# Patient Record
Sex: FEMALE | Race: AMERICAN INDIAN OR ALASKA NATIVE
[De-identification: names, ages, dates, MRNs, and addresses within clinical notes are randomized per-mention and may not be internally consistent; named-entity substitution may affect disease eponyms.]

---

## 2019-05-30 ENCOUNTER — HOSPITAL ENCOUNTER (INPATIENT)
Dept: HOSPITAL 5 - ED | Age: 69
LOS: 3 days | Discharge: HOME | DRG: 312 | End: 2019-06-02
Attending: HOSPITALIST | Admitting: HOSPITALIST
Payer: MEDICARE

## 2019-05-30 DIAGNOSIS — Z90.49: ICD-10-CM

## 2019-05-30 DIAGNOSIS — R07.81: ICD-10-CM

## 2019-05-30 DIAGNOSIS — I34.1: ICD-10-CM
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
## 2019-05-30 DIAGNOSIS — E86.0: ICD-10-CM

## 2019-05-30 DIAGNOSIS — K21.9: ICD-10-CM

## 2019-05-30 DIAGNOSIS — G43.909: ICD-10-CM

## 2019-05-30 DIAGNOSIS — R55: Primary | ICD-10-CM
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
## 2019-05-30 DIAGNOSIS — G35: ICD-10-CM

## 2019-05-30 DIAGNOSIS — I10: ICD-10-CM

## 2019-05-30 DIAGNOSIS — S00.83XA: ICD-10-CM
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
## 2019-05-30 DIAGNOSIS — N39.0: ICD-10-CM

## 2019-05-30 DIAGNOSIS — F12.90: ICD-10-CM

## 2019-05-30 LAB
ALBUMIN SERPL-MCNC: 3.6 G/DL (ref 3.9–5)
ALT SERPL-CCNC: 13 UNITS/L (ref 7–56)
APTT BLD: 23.8 SEC. (ref 24.2–36.6)
BASOPHILS # (AUTO): 0.1 K/MM3 (ref 0–0.1)
BASOPHILS NFR BLD AUTO: 0.6 % (ref 0–1.8)
BUN SERPL-MCNC: 19 MG/DL (ref 7–17)
BUN/CREAT SERPL: 16 %
CALCIUM SERPL-MCNC: 9.4 MG/DL (ref 8.4–10.2)
EOSINOPHIL # BLD AUTO: 0.2 K/MM3 (ref 0–0.4)
EOSINOPHIL NFR BLD AUTO: 1.7 % (ref 0–4.3)
HCT VFR BLD CALC: 41 % (ref 30.3–42.9)
HEMOLYSIS INDEX: 41
HGB BLD-MCNC: 13.9 GM/DL (ref 10.1–14.3)
INR PPP: 0.89 (ref 0.87–1.13)
LYMPHOCYTES # BLD AUTO: 1.7 K/MM3 (ref 1.2–5.4)
LYMPHOCYTES NFR BLD AUTO: 12.3 % (ref 13.4–35)
MCHC RBC AUTO-ENTMCNC: 34 % (ref 30–34)
MCV RBC AUTO: 94 FL (ref 79–97)
MONOCYTES # (AUTO): 0.9 K/MM3 (ref 0–0.8)
MONOCYTES % (AUTO): 6.5 % (ref 0–7.3)
PLATELET # BLD: 189 K/MM3 (ref 140–440)
RBC # BLD AUTO: 4.36 M/MM3 (ref 3.65–5.03)

## 2019-05-30 PROCEDURE — 80048 BASIC METABOLIC PNL TOTAL CA: CPT

## 2019-05-30 PROCEDURE — 80053 COMPREHEN METABOLIC PANEL: CPT

## 2019-05-30 PROCEDURE — 84484 ASSAY OF TROPONIN QUANT: CPT

## 2019-05-30 PROCEDURE — 82550 ASSAY OF CK (CPK): CPT

## 2019-05-30 PROCEDURE — 72125 CT NECK SPINE W/O DYE: CPT

## 2019-05-30 PROCEDURE — 81001 URINALYSIS AUTO W/SCOPE: CPT

## 2019-05-30 PROCEDURE — 36415 COLL VENOUS BLD VENIPUNCTURE: CPT

## 2019-05-30 PROCEDURE — 70551 MRI BRAIN STEM W/O DYE: CPT

## 2019-05-30 PROCEDURE — 93010 ELECTROCARDIOGRAM REPORT: CPT

## 2019-05-30 PROCEDURE — 87086 URINE CULTURE/COLONY COUNT: CPT

## 2019-05-30 PROCEDURE — 93005 ELECTROCARDIOGRAM TRACING: CPT

## 2019-05-30 PROCEDURE — 83735 ASSAY OF MAGNESIUM: CPT

## 2019-05-30 PROCEDURE — 82553 CREATINE MB FRACTION: CPT

## 2019-05-30 PROCEDURE — 82962 GLUCOSE BLOOD TEST: CPT

## 2019-05-30 PROCEDURE — 85025 COMPLETE CBC W/AUTO DIFF WBC: CPT

## 2019-05-30 PROCEDURE — 70544 MR ANGIOGRAPHY HEAD W/O DYE: CPT

## 2019-05-30 PROCEDURE — 85730 THROMBOPLASTIN TIME PARTIAL: CPT

## 2019-05-30 PROCEDURE — 85610 PROTHROMBIN TIME: CPT

## 2019-05-30 PROCEDURE — 70450 CT HEAD/BRAIN W/O DYE: CPT

## 2019-05-30 PROCEDURE — 70547 MR ANGIOGRAPHY NECK W/O DYE: CPT

## 2019-05-30 PROCEDURE — 84100 ASSAY OF PHOSPHORUS: CPT

## 2019-05-30 PROCEDURE — 93306 TTE W/DOPPLER COMPLETE: CPT

## 2019-05-30 NOTE — EMERGENCY DEPARTMENT REPORT
Blank Doc





- Documentation


Documentation: 





This is a 68-year-old female that presents with syncope episode today.  





This initial assessment/diagnostic orders/clinical plan/treatment(s) is/are 

subject to change based on patient's health status, clinical progression and re-

assessment by fellow clinical providers in the ED.  Further treatment and workup

at subsequent clinical providers discretion.  Patient/guardians urged not to 

elope from the ED as their condition may be serious if not clinically assessed 

and managed.  Initial orders include:


1- Patient sent to MAIN ED for further evaluation and treatment


2- EKG


3- labs


3- CT head

## 2019-05-31 LAB
BACTERIA #/AREA URNS HPF: (no result) /HPF
BILIRUB UR QL STRIP: (no result)
BLOOD UR QL VISUAL: (no result)
HYALINE CASTS #/AREA URNS LPF: 5 /LPF
MUCOUS THREADS #/AREA URNS HPF: (no result) /HPF
PH UR STRIP: 6 [PH] (ref 5–7)
PROT UR STRIP-MCNC: (no result) MG/DL
RBC #/AREA URNS HPF: 2 /HPF (ref 0–6)
UROBILINOGEN UR-MCNC: < 2 MG/DL (ref ?–2)
WBC #/AREA URNS HPF: 11 /HPF (ref 0–6)

## 2019-05-31 RX ADMIN — METOPROLOL TARTRATE SCH MG: 50 TABLET, FILM COATED ORAL at 18:02

## 2019-05-31 RX ADMIN — OXYCODONE AND ACETAMINOPHEN PRN TAB: 5; 325 TABLET ORAL at 09:29

## 2019-05-31 RX ADMIN — MONTELUKAST SCH MG: 10 TABLET, FILM COATED ORAL at 18:02

## 2019-05-31 RX ADMIN — HYDRALAZINE HYDROCHLORIDE SCH: 25 TABLET, FILM COATED ORAL at 22:00

## 2019-05-31 RX ADMIN — Medication SCH ML: at 09:29

## 2019-05-31 RX ADMIN — VERAPAMIL HYDROCHLORIDE SCH MG: 240 TABLET, FILM COATED, EXTENDED RELEASE ORAL at 18:02

## 2019-05-31 RX ADMIN — OXYCODONE AND ACETAMINOPHEN PRN TAB: 5; 325 TABLET ORAL at 23:20

## 2019-05-31 RX ADMIN — Medication SCH ML: at 22:00

## 2019-05-31 NOTE — MAGNETIC RESONANCE REPORT
MRA NECK WITHOUT CONTRAST



HISTORY: Syncope.



TECHNIQUE: Time-of-flight imaging with rotational MIP reformations of 

the neck is submitted.  



FINDINGS:

The bilateral carotid and vertebral systems appear widely

patent and free of hemodynamically significant stenosis, aneurysm, or 

dissection.  



IMPRESSION:

Unremarkable MR angiography of the neck.

## 2019-05-31 NOTE — EVENT NOTE
Date: 05/31/19





Patient seen and examined


Patient admitted with a complaint of syncope


Continue current management and plan as dictated H&P


Wait for medical records from Martín Chauhan, cardiology following.

## 2019-05-31 NOTE — MAGNETIC RESONANCE REPORT
MRA HEAD WITHOUT CONTRAST



HISTORY: Syncope.



Time-of-flight imaging with MIP reformations of the [place]

[place] is submitted.



The arteries appear widely patent and free of hemodynamically

significant stenosis, aneurysm or dissection.  



IMPRESSION:

Unremarkable MRA head.

## 2019-05-31 NOTE — CAT SCAN REPORT
PROCEDURE: CT CERVICAL SPINE WO CON 

 

TECHNIQUE:  Computerized tomography of the cervical spine was performed from the skull base to T1 wit
hout contrast material.   

 

 

HISTORY: Syncope 

 

COMPARISONS:  None . 

 

FINDINGS: 

 

No fracture or subluxation is seen. Prevertebral soft tissues appear normal. Posterior elements are i
ntact. Moderate facet arthritis visualized bilaterally at C2-3, C3-4, milder changes seen in the miguel angel
gutierrez of the cervical spine.. Diffuse disc space narrowing with anterior and posterior osteophyte for
mation visualized from the C3-4 through the C7-T1 level. The posterior osteophytic spurs are largest 
at C6-C7. No focal disc herniation is seen. The posterior osteophytic spurs do obscure portions of th
e anterior epidural space without definite cord compression or spinal stenosis. 

 

 

IMPRESSION:  No fracture or subluxation is seen. Moderate degenerative disc disease 7) is present as 
described. No focal disc herniation or spinal stenosis is seen. . 

 

This document is electronically signed by Veto Bentley MD., May 31 2019 12:41:43 AM ET

## 2019-05-31 NOTE — HISTORY AND PHYSICAL REPORT
<SAINT-JORGE,HURLINE - Last Filed: 05/31/19 05:22>





History of Present Illness


Date of examination: 05/31/19


Date of admission: 


05/31/19 03:21





Chief complaint: 





Syncope


History of present illness: 





Pt is a 68-year-old female with PMHx of GERD, hypertension, multiple sclerosis, 

and mitral valve prolapse (MVP) who was brought to the ER for complaints of 

syncopal episode.  Patient states she felt lightheaded and weak, lost 

consciousness and collapsed to the ground.  Pt's daughter who witness the 

incident states that she passed out  for a short period, landed on the ground 

and strike the left side of her head.  She did not report any seizure activity, 

bladder or bowel incontinence, postical confusion or disorientation.  Patient 

complains of headache, pain on the left rib cage, that is worse with movement, 

manipulation and with deep breathing.  Pt denies chest pain, denies 

palpitations, denies abdominal pain, denies dizziness, denies nausea, denies 

vomiting, denies SOB,  denies diaphoresis.  On admission, pt was alert and 

orientated x 4, pleasant, able to provide medical history with excellent memory.

A CT scan of the head was negative except for mild decrease periventricular 

white matter, CT spine negative fracture except for degenerative joint disease, 

reviewed extremity was negative for fracture.  Patient is admitted for further 

evaluation of her syncopal episode.





Past History


Past Medical History: CAD (mitral valve prolapse), GERD, hypertension, 

migraines, other (multiple sclerosis)


Past Surgical History: No surgical history


Social history: no significant social history


Family history: no significant family history





Medications and Allergies


                                    Allergies











Allergy/AdvReac Type Severity Reaction Status Date / Time


 


No Known Allergies Allergy   Unverified 05/30/19 21:21











Active Meds: 


Active Medications





Acetaminophen (Tylenol)  650 mg PO Q4H PRN


   PRN Reason: Pain MILD(1-3)/Fever >100.5/HA


Ondansetron HCl (Zofran)  4 mg IV Q8H PRN


   PRN Reason: Nausea And Vomiting


Sodium Chloride (Sodium Chloride Flush Syringe 10 Ml)  10 ml IV BID LV


Sodium Chloride (Sodium Chloride Flush Syringe 10 Ml)  10 ml IV PRN PRN


   PRN Reason: LINE FLUSH











Review of Systems


Musculoskeletal: other (left rib cage pain)





Exam





- Constitutional


Vitals: 


                                        











Temp Pulse Resp BP Pulse Ox


 


 98.0 F   75   16   165/74   99 


 


 05/30/19 22:01  05/30/19 23:31  05/30/19 23:33  05/31/19 04:05  05/31/19 04:05











General appearance: Present: no acute distress





- EENT


Eyes: Present: PERRL, EOM intact


ENT: hearing intact





- Neck


Neck: Present: supple





- Respiratory


Respiratory: bilateral: CTA





- Cardiovascular


Heart Sounds: Present: S1 & S2





- Extremities


Extremities: no ischemia


Peripheral Pulses: within normal limits





- Abdominal


General gastrointestinal: Present: soft, non-tender, distended





- Rectal


Rectal Exam: deferred





- Integumentary


Integumentary: Present: clear, warm, dry





- Musculoskeletal


Musculoskeletal: strength equal bilaterally





- Psychiatric


Psychiatric: appropriate mood/affect, cooperative





- Neurologic


Neurologic: moves all extremities





Results





- Labs


CBC & Chem 7: 


                                 05/30/19 22:50





                                 05/30/19 22:50


Labs: 


                             Laboratory Last Values











WBC  13.6 K/mm3 (4.5-11.0)  H  05/30/19  22:50    


 


RBC  4.36 M/mm3 (3.65-5.03)   05/30/19  22:50    


 


Hgb  13.9 gm/dl (10.1-14.3)   05/30/19  22:50    


 


Hct  41.0 % (30.3-42.9)   05/30/19  22:50    


 


MCV  94 fl (79-97)   05/30/19  22:50    


 


MCH  32 pg (28-32)   05/30/19  22:50    


 


MCHC  34 % (30-34)   05/30/19  22:50    


 


RDW  14.7 % (13.2-15.2)   05/30/19  22:50    


 


Plt Count  189 K/mm3 (140-440)   05/30/19  22:50    


 


Lymph % (Auto)  12.3 % (13.4-35.0)  L  05/30/19  22:50    


 


Mono % (Auto)  6.5 % (0.0-7.3)   05/30/19  22:50    


 


Eos % (Auto)  1.7 % (0.0-4.3)   05/30/19  22:50    


 


Baso % (Auto)  0.6 % (0.0-1.8)   05/30/19  22:50    


 


Lymph #  1.7 K/mm3 (1.2-5.4)   05/30/19  22:50    


 


Mono #  0.9 K/mm3 (0.0-0.8)  H  05/30/19  22:50    


 


Eos #  0.2 K/mm3 (0.0-0.4)   05/30/19  22:50    


 


Baso #  0.1 K/mm3 (0.0-0.1)   05/30/19  22:50    


 


Seg Neutrophils %  78.9 % (40.0-70.0)  H  05/30/19  22:50    


 


Seg Neutrophils #  10.7 K/mm3 (1.8-7.7)  H  05/30/19  22:50    


 


PT  12.6 Sec. (12.2-14.9)   05/30/19  22:50    


 


INR  0.89  (0.87-1.13)   05/30/19  22:50    


 


APTT  23.8 Sec. (24.2-36.6)  L  05/30/19  22:50    


 


Sodium  141 mmol/L (137-145)   05/30/19  22:50    


 


Potassium  3.9 mmol/L (3.6-5.0)   05/30/19  22:50    


 


Chloride  108.5 mmol/L ()  H  05/30/19  22:50    


 


Carbon Dioxide  20 mmol/L (22-30)  L  05/30/19  22:50    


 


  16 mmol/L  05/30/19  22:50    


 


BUN  19 mg/dL (7-17)  H  05/30/19  22:50    


 


  1.2 mg/dL (0.7-1.2)   05/30/19  22:50    


 


Estimated GFR  54 ml/min  05/30/19  22:50    


 


  16 %  05/30/19  22:50    


 


Glucose  110 mg/dL ()  H  05/30/19  22:50    


 


Calcium  9.4 mg/dL (8.4-10.2)   05/30/19  22:50    


 


  < 0.20 mg/dL (0.1-1.2)   05/30/19  22:50    


 


AST  16 units/L (5-40)   05/30/19  22:50    


 


ALT  13 units/L (7-56)   05/30/19  22:50    


 


  49 units/L ()   05/30/19  22:50    


 


  125 units/L ()   05/30/19  22:50    


 


CK-MB (CK-2)  2.8 ng/mL (0.0-4.0)   05/30/19  22:50    


 


CK-MB (CK-2) Rel Index  2.2  (0-4)   05/30/19  22:50    


 


  < 0.010 ng/mL (0.00-0.029)   05/30/19  22:50    


 


  6.8 g/dL (6.3-8.2)   05/30/19  22:50    


 


  3.6 g/dL (3.9-5)  L  05/30/19  22:50    


 


  1.1 %  05/30/19  22:50    


 


  Yellow  (Yellow)   05/30/19  04:00    


 


  Slightly-cloudy  (Clear)   05/30/19  04:00    


 


  6.0  (5.0-7.0)   05/30/19  04:00    


 


Ur Specific Gravity  1.023  (1.003-1.030)   05/30/19  04:00    


 


  <15 mg/dl mg/dL (Negative)   05/30/19  04:00    


 


  Neg mg/dL (Negative)   05/30/19  04:00    


 


  Neg mg/dL (Negative)   05/30/19  04:00    


 


  Neg  (Negative)   05/30/19  04:00    


 


  Neg  (Negative)   05/30/19  04:00    


 


  Neg  (Negative)   05/30/19  04:00    


 


  < 2.0 mg/dL (<2.0)   05/30/19  04:00    


 


Ur Leukocyte Esterase  Neg  (Negative)   05/30/19  04:00    


 


  11.0 /HPF (0.0-6.0)  H  05/30/19  04:00    


 


  2.0 /HPF (0.0-6.0)   05/30/19  04:00    


 


U Epithel Cells (Auto)  7.0 /HPF (0-13.0)   05/30/19  04:00    


 


  1+ /HPF (Negative)   05/30/19  04:00    


 


Hyaline Casts  5 /LPF  05/30/19  04:00    


 


  2+ /HPF  05/30/19  04:00    














Assessment and Plan


Assessment and plan: 





1.  Acute syncopal episode (likely due to valvular disease)


2.  Mitral valve prolapse


3.  Mild leukocytosis (likely due to the margination)


4.  Multiple sclerosis (on remission)


5. H/o migraine headache


6.  Hypertension


7.  GERD





Plan.  Patient is admitted for acute syncope


Neuro check every 4 hours


Orthostatic vital signs


MRI of the brain, MRA of the head and neck


Echocardiogram to evaluate for valvular disease


Review home meds when available


For percussion


We'll consult cardiology or Neurology test results available


Plan of care discussed with patient voiced understanding


Advance Directives: Yes


VTE prophylaxis?: Mechanical


Plan of care discussed with patient/family: Yes





<NAVIN UGAMAN - Last Filed: 06/01/19 03:15>





History of Present Illness


Date of admission: 


05/31/19 03:21








Medications and Allergies


Active Meds: 


Active Medications





Acetaminophen (Tylenol)  650 mg PO Q4H PRN


   PRN Reason: Pain MILD(1-3)/Fever >100.5/HA


Hydralazine HCl (Apresoline)  25 mg PO Q8HR [facility]


Lorazepam (Ativan)  2 mg IV Q4H PRN


   PRN Reason: Agitation


   Last Admin: 05/31/19 09:30 Dose:  2 mg


   Documented by: 


Metoprolol Tartrate (Lopressor)  50 mg PO DAILY [facility]


   Last Admin: 05/31/19 18:02 Dose:  50 mg


   Documented by: 


Miscellaneous Medication (Avonex (Nf))  30 mg SUB-Q QWEEK [facility]


Montelukast Sodium (Singulair)  10 mg PO QPM [facility]


   Last Admin: 05/31/19 18:02 Dose:  10 mg


   Documented by: 


Ondansetron HCl (Zofran)  4 mg IV Q8H PRN


   PRN Reason: Nausea And Vomiting


Oxycodone/Acetaminophen (Percocet 5/325)  1 tab PO Q6H PRN


   PRN Reason: Pain, Moderate (4-6)


   Last Admin: 05/31/19 23:20 Dose:  1 tab


   Documented by: 


Pantoprazole Sodium (Protonix)  40 mg PO DAILY [facility]


Sodium Chloride (Sodium Chloride Flush Syringe 10 Ml)  10 ml IV BID [facility]


   Last Admin: 05/31/19 09:29 Dose:  10 ml


   Documented by: 


Sodium Chloride (Sodium Chloride Flush Syringe 10 Ml)  10 ml IV PRN PRN


   PRN Reason: LINE FLUSH


Verapamil HCl (Calan Sr)  240 mg PO Q12HR [facility]


   Last Admin: 05/31/19 18:02 Dose:  240 mg


   Documented by: 











Exam





- Constitutional


Vitals: 


                                        











Temp Pulse Resp BP Pulse Ox


 


 98.3 F   71   20   132/77   94 


 


 06/01/19 00:08  06/01/19 00:08  06/01/19 00:08  06/01/19 00:08  06/01/19 00:08














Results





- Labs


CBC & Chem 7: 


                                 05/30/19 22:50





                                 05/30/19 22:50


Labs: 


                             Laboratory Last Values











WBC  13.6 K/mm3 (4.5-11.0)  H  05/30/19  22:50    


 


RBC  4.36 M/mm3 (3.65-5.03)   05/30/19  22:50    


 


Hgb  13.9 gm/dl (10.1-14.3)   05/30/19  22:50    


 


Hct  41.0 % (30.3-42.9)   05/30/19  22:50    


 


MCV  94 fl (79-97)   05/30/19  22:50    


 


MCH  32 pg (28-32)   05/30/19  22:50    


 


MCHC  34 % (30-34)   05/30/19  22:50    


 


RDW  14.7 % (13.2-15.2)   05/30/19  22:50    


 


Plt Count  189 K/mm3 (140-440)   05/30/19  22:50    


 


Lymph % (Auto)  12.3 % (13.4-35.0)  L  05/30/19  22:50    


 


Mono % (Auto)  6.5 % (0.0-7.3)   05/30/19  22:50    


 


Eos % (Auto)  1.7 % (0.0-4.3)   05/30/19  22:50    


 


Baso % (Auto)  0.6 % (0.0-1.8)   05/30/19  22:50    


 


Lymph #  1.7 K/mm3 (1.2-5.4)   05/30/19  22:50    


 


Mono #  0.9 K/mm3 (0.0-0.8)  H  05/30/19  22:50    


 


Eos #  0.2 K/mm3 (0.0-0.4)   05/30/19  22:50    


 


Baso #  0.1 K/mm3 (0.0-0.1)   05/30/19  22:50    


 


Seg Neutrophils %  78.9 % (40.0-70.0)  H  05/30/19  22:50    


 


Seg Neutrophils #  10.7 K/mm3 (1.8-7.7)  H  05/30/19  22:50    


 


PT  12.6 Sec. (12.2-14.9)   05/30/19  22:50    


 


INR  0.89  (0.87-1.13)   05/30/19  22:50    


 


APTT  23.8 Sec. (24.2-36.6)  L  05/30/19  22:50    


 


Sodium  141 mmol/L (137-145)   05/30/19  22:50    


 


Potassium  3.9 mmol/L (3.6-5.0)   05/30/19  22:50    


 


Chloride  108.5 mmol/L ()  H  05/30/19  22:50    


 


Carbon Dioxide  20 mmol/L (22-30)  L  05/30/19  22:50    


 


  16 mmol/L  05/30/19  22:50    


 


BUN  19 mg/dL (7-17)  H  05/30/19  22:50    


 


  1.2 mg/dL (0.7-1.2)   05/30/19  22:50    


 


Estimated GFR  54 ml/min  05/30/19  22:50    


 


  16 %  05/30/19  22:50    


 


Glucose  110 mg/dL ()  H  05/30/19  22:50    


 


POC Glucose  112  ()  H  05/31/19  17:45    


 


Calcium  9.4 mg/dL (8.4-10.2)   05/30/19  22:50    


 


  < 0.20 mg/dL (0.1-1.2)   05/30/19  22:50    


 


AST  16 units/L (5-40)   05/30/19  22:50    


 


ALT  13 units/L (7-56)   05/30/19  22:50    


 


  49 units/L ()   05/30/19  22:50    


 


  125 units/L ()   05/30/19  22:50    


 


CK-MB (CK-2)  2.8 ng/mL (0.0-4.0)   05/30/19  22:50    


 


CK-MB (CK-2) Rel Index  2.2  (0-4)   05/30/19  22:50    


 


  < 0.010 ng/mL (0.00-0.029)   05/30/19  22:50    


 


  6.8 g/dL (6.3-8.2)   05/30/19  22:50    


 


  3.6 g/dL (3.9-5)  L  05/30/19  22:50    


 


  1.1 %  05/30/19  22:50    


 


  Yellow  (Yellow)   05/30/19  04:00    


 


  Slightly-cloudy  (Clear)   05/30/19  04:00    


 


  6.0  (5.0-7.0)   05/30/19  04:00    


 


Ur Specific Gravity  1.023  (1.003-1.030)   05/30/19  04:00    


 


  <15 mg/dl mg/dL (Negative)   05/30/19  04:00    


 


  Neg mg/dL (Negative)   05/30/19  04:00    


 


  Neg mg/dL (Negative)   05/30/19  04:00    


 


  Neg  (Negative)   05/30/19  04:00    


 


  Neg  (Negative)   05/30/19  04:00    


 


  Neg  (Negative)   05/30/19  04:00    


 


  < 2.0 mg/dL (<2.0)   05/30/19  04:00    


 


Ur Leukocyte Esterase  Neg  (Negative)   05/30/19  04:00    


 


  11.0 /HPF (0.0-6.0)  H  05/30/19  04:00    


 


  2.0 /HPF (0.0-6.0)   05/30/19  04:00    


 


U Epithel Cells (Auto)  7.0 /HPF (0-13.0)   05/30/19  04:00    


 


  1+ /HPF (Negative)   05/30/19  04:00    


 


Hyaline Casts  5 /LPF  05/30/19  04:00    


 


  2+ /HPF  05/30/19  04:00    














Assessment and Plan


Assessment and plan: 





I personally discussed the patient with the NP-C. I agree with the above 

assessment and plan

## 2019-05-31 NOTE — EMERGENCY DEPARTMENT REPORT
ED Syncope HPI





- General


Chief Complaint: Syncope


Stated Complaint: FELL WITH HEAD INJURY


Time Seen by Provider: 05/30/19 22:03


Source: patient, family


Exam Limitations: no limitations





- History of Present Illness


Initial Comments: 





68-year-old female with past medical history GERD, hypertension, multiple 

sclerosis, and mitral valve prolapse as a possible complaints of syncopal 

episode.  Patient states she felt lightheaded and weak all over.  Her daughter 

showed me a video of the patient.  She was exiting the home when episode 

happened.  At the doorway she became weak and wasn't responding to conversation.

 She then fell forward as her purse caught in the door handle, landing on the 

ground and striking the left side of her head.  No seizure activity noted.  

Unconscious episode was very brief.  Patient has been icing her left forehead 

hematoma.  Complains of 7/10 headache.  She denies any shortness of breath, 

chest pain, palpitations, abdominal pain, nausea, vomiting, diaphoresis, melena,

hematochezia, diarrhea, hematemesis, or decreased by mouth intake.  Since the 

fall she has had a left sided rib pain that is worse with movement, palpation, 

and deep inspiration.  Patient reports he had a stress test several weeks ago 

performed by her PMD.  PMD is affiliated with [facility].   She also sees a 

neurologist for her multiple sclerosis.





- Related Data


Allergies/Adverse Reactions: 


Allergies





No Known Allergies Allergy (Unverified 05/30/19 21:21)


   











ED Review of Systems


ROS: 


Stated complaint: FELL WITH HEAD INJURY


Other details as noted in HPI





Comment: All other systems reviewed and negative





ED Past Medical Hx





- Past Medical History


Hx Hypertension: Yes


Hx CVA: No


Hx Heart Attack/AMI: No


Hx Congestive Heart Failure: No


Hx Diabetes: No


Hx Deep Vein Thrombosis: No


Hx Pulmonary Embolism: No


Hx GERD: Yes


Hx Liver Disease: No


Hx Renal Disease: No


Hx of Cancer: No


Hx Sickle Cell Disease: No


Hx Arthritis: Yes


Hx Headaches / Migraines: Yes


Hx Seizures: No


Hx Kidney Stones: No


Hx Psychiatric Treatment: No


Hx Asthma:  (Bronchitis)


Hx COPD: No


Hx Tuberculosis: No


Hx Dementia: No


Hx HIV: No


Additional medical history: MS, MVP





- Surgical History


Hx Open Heart Surgery: No


Hx Pacemaker: No


Hx Internal Defibrillator: No


Hx Cholecystectomy: Yes


Hx Appendectomy: No


Hx Breast Surgery: No


Additional Surgical History: Hernia Repair, ACL Repair





- Social History


Smoking Status: Never Smoker


Substance Use Type: Marijuana





ED Physical Exam





- General


Limitations: No Limitations





- Other


Other exam information: 





General: No limitations, patient is alert in no acute distress


Head exam: Atraumatic, normocephalic


Eyes exam: Normal appearance


ENT: Moist mucous membrane, normal oropharynx


Neck exam: Normal inspection, full range of motion, no meningismus nontender


Respiratory exam: Clear to auscultation bilateral, no wheezes, rales, crackles


Cardiovascular: Normal rate and rhythm, lower rib tenderness underneath the 

breast.  Without crepitus, ecchymosis, or deformity.  L


Abdomen: Soft, nondistended, and  nontender, with normal bowel sounds, no 

rebound, or guarding


Extremity: Full range of motion normal inspection no deformity


Back: Normal Inspection, full range of motion, no tenderness


Neurologic: Alert, oriented x3, cranial nerves intact, no motor or sensory 

deficit


Psychiatric: normal affect, normal mood


Skin: Warm, dry, intact





ED Course


                                   Vital Signs











  05/30/19 05/30/19 05/30/19





  21:17 22:01 23:26


 


Temperature 98.0 F 98.0 F 


 


Pulse Rate 87 90 77


 


Respiratory 18 18 11 L





Rate   


 


Blood Pressure 105/56 105/56 


 


O2 Sat by Pulse 98 98 





Oximetry   














  05/30/19 05/30/19 05/30/19





  23:27 23:28 23:29


 


Temperature   


 


Pulse Rate 79 78 81


 


Respiratory 13 16 12





Rate   


 


Blood Pressure  165/84 165/84


 


O2 Sat by Pulse 99 99 99





Oximetry   














  05/30/19 05/30/19





  23:31 23:33


 


Temperature  


 


Pulse Rate 75 


 


Respiratory 11 L 16





Rate  


 


Blood Pressure 165/84 


 


O2 Sat by Pulse 99 99





Oximetry  














ED Medical Decision Making





- Lab Data


Result diagrams: 


                                 05/30/19 22:50





                                 05/30/19 22:50








                                   Lab Results











  05/30/19 05/30/19 05/30/19 Range/Units





  22:50 22:50 22:50 


 


WBC  13.6 H    (4.5-11.0)  K/mm3


 


RBC  4.36    (3.65-5.03)  M/mm3


 


Hgb  13.9    (10.1-14.3)  gm/dl


 


Hct  41.0    (30.3-42.9)  %


 


MCV  94    (79-97)  fl


 


MCH  32    (28-32)  pg


 


MCHC  34    (30-34)  %


 


RDW  14.7    (13.2-15.2)  %


 


Plt Count  189    (140-440)  K/mm3


 


Lymph % (Auto)  12.3 L    (13.4-35.0)  %


 


Mono % (Auto)  6.5    (0.0-7.3)  %


 


Eos % (Auto)  1.7    (0.0-4.3)  %


 


Baso % (Auto)  0.6    (0.0-1.8)  %


 


Lymph #  1.7    (1.2-5.4)  K/mm3


 


Mono #  0.9 H    (0.0-0.8)  K/mm3


 


Eos #  0.2    (0.0-0.4)  K/mm3


 


Baso #  0.1    (0.0-0.1)  K/mm3


 


Seg Neutrophils %  78.9 H    (40.0-70.0)  %


 


Seg Neutrophils #  10.7 H    (1.8-7.7)  K/mm3


 


PT    12.6  (12.2-14.9)  Sec.


 


INR    0.89  (0.87-1.13)  


 


APTT    23.8 L  (24.2-36.6)  Sec.


 


Sodium   141   (137-145)  mmol/L


 


Potassium   3.9   (3.6-5.0)  mmol/L


 


Chloride   108.5 H   ()  mmol/L


 


Carbon Dioxide   20 L   (22-30)  mmol/L


 


Anion Gap   16   mmol/L


 


BUN   19 H   (7-17)  mg/dL


 


Creatinine   1.2   (0.7-1.2)  mg/dL


 


Estimated GFR   54   ml/min


 


BUN/Creatinine Ratio   16   %


 


Glucose   110 H   ()  mg/dL


 


Calcium   9.4   (8.4-10.2)  mg/dL


 


Total Bilirubin   < 0.20   (0.1-1.2)  mg/dL


 


AST   16   (5-40)  units/L


 


ALT   13   (7-56)  units/L


 


Alkaline Phosphatase   49   ()  units/L


 


Total Creatine Kinase   125   ()  units/L


 


CK-MB (CK-2)   2.8   (0.0-4.0)  ng/mL


 


CK-MB (CK-2) Rel Index   2.2   (0-4)  


 


Troponin T   < 0.010   (0.00-0.029)  ng/mL


 


Total Protein   6.8   (6.3-8.2)  g/dL


 


Albumin   3.6 L   (3.9-5)  g/dL


 


Albumin/Globulin Ratio   1.1   %














- EKG Data


-: EKG Interpreted by Me


EKG shows normal: sinus rhythm (69), axis (qrs -52), QRS complexes (qrsd 76), 

ST-T waves (lat t wave )


Rate: normal





- EKG Data


When compared to previous EKG there are: previous EKG unavailable





- Radiology Data


Radiology results: report reviewed


CT head non contrast





HISTORY: Syncope 





COMPARISONS: None . 





FINDINGS: 





Brain: There is no evidence of intracranial hemorrhage. No parenchymal 

hemorrhage is seen. No 


mass lesions or mass effect is identified. No abnormal extra-axial fluid 

collections or masses are 


seen. There is nonspecific mineralization of the right basal ganglia. 





There is some decreased density seen in the periventricular white matter without

mass effect. This


is fairly symmetric and does not exhibit any mass effect consistent with gliosis

probably on the 


basis of microvascular disease or white matter changes of aging. 





Ventricles: The ventricles are normal size and are midline.. 





Bone Windows: No evidence of fracture. 





Paranasal sinuses: Visualized portions appear clear.. 





Mastoid air cells: Clear. 





IMPRESSION: 





Mild decreased density periventricular white matter without mass effect 

suggesting gliosis related 


to microvascular disease or white matter changes of aging. 





No other abnormalities are identified. 





PROCEDURE: CT CERVICAL SPINE WO CON 





TECHNIQUE: Computerized tomography of the cervical spine was performed from the 

skull base to T1 


without contrast material. 








HISTORY: Syncope 





COMPARISONS: None . 





FINDINGS: 





No fracture or subluxation is seen. Prevertebral soft tissues appear normal. 

Posterior elements are


intact. Moderate facet arthritis visualized bilaterally at C2-3, C3-4, milder 

changes seen in the 


remainder of the cervical spine.. Diffuse disc space narrowing with anterior and

posterior 


osteophyte formation visualized from the C3-4 through the C7-T1 level. The 

posterior osteophytic 


spurs are largest at C6-C7. No focal disc herniation is seen. The posterior 

osteophytic spurs do 


obscure portions of the anterior epidural space without definite cord 

compression or spinal 


stenosis. 








IMPRESSION: No fracture or subluxation is seen. Moderate degenerative disc 

disease 7) is present 


as described. No focal disc herniation or spinal stenosis is seen. . 








PROCEDURE: XR RIBS UNI W PA CHEST 3+V LT 





TECHNIQUE: Unilateral rib radiographs, 2 views of the left ribs. 





HISTORY: fall, left rib pain 





COMPARISONS: None . 





FINDINGS: 





Lung: Normal . 


Pleural space: Normal . 


Pneumothorax: None . 


Bony thorax/ribs: No acute or displaced rib fractures. . 





IMPRESSION: No acute or displaced rib fracture





- Medical Decision Making





pt s/p syncope episode with forehead and rib injury


ct images without acute findings


ekg with flip t waves lat, no previous for comparison, trp neg. cp reproducible 

and occured after fall on left side


rib series results pending at dispo


pt tx with asa and tramadol


hospitalist informed for admission





- Differential Diagnosis


fracture, contusion, sprain


Critical Care Time: No


Critical care attestation.: 


If time is entered above; I have spent that time in minutes in the direct care 

of this critically ill patient, excluding procedure time.








ED Disposition


Clinical Impression: 


 Syncope, Rib pain on left side, Hx of multiple sclerosis, HTN (hypertension), 

History of mitral valve prolapse, Traumatic hematoma of forehead





Disposition: DC-[id number] OP ADMIT IP TO THIS HOSP


Is pt being admited?: Yes


Condition: Stable


Time of Disposition: 01:50

## 2019-05-31 NOTE — MAGNETIC RESONANCE REPORT
MRI OF THE BRAIN WITHOUT CONTRAST:



HISTORY: Syncope



PROCEDURE:

Multiplanar, multisequence MR imaging of the brain without IV contrast 

was

performed.  



FINDINGS:

Compared to the CT head dated 5/3619. MRI also demonstrates mild 

nonspecific chronic white matter changes bilaterally. This most likely 

represents chronic microvascular ischemic disease although edema 

limited toward disease could be considered in the appropriate clinical 

presentation. Otherwise, the brain parenchyma signal intensity and its 

grey white interface are within normal limits on all sequences.



No evidence for acute ischemia, hemorrhage or mass.  No chronic infarct 

or extra-axial fluid collection.



The midline structures are central.  The basal cisterns are patent. 

Normal ventricular size.



The orbital cavities and sella turcica demonstrate no abnormality.



The visualized paranasal sinuses and mastoid air cells are well 

aerated. 1 cm polyp in the medial left maxillary sinus is noted.



IMPRESSION:

Nonspecific chronic white matter changes. No acute intracranial process 

is identified.

## 2019-05-31 NOTE — CONSULTATION
History of Present Illness


Consult date: 05/31/19


Requesting physician: RODGER WASHBURN


Consult reason: syncope


History of present illness: 


The pt is a 68-year-old female with past medical history of GERD, hypertension, 

multiple sclerosis, and reported mitral valve prolapse. She states that she is 

regularly followed by Dr. Luís Clemons, a cardiologist in [place], [place]. She 

presented for evaluation following a syncopal episode. She states that she was 

walking out of her daughter's house when she suddenly felt lightheaded and weak 

all over. Her daughter has a doorbell with video capabilities and the event was 

caught on video. Pt was exiting the home when episode happened and at the 

doorway she became weak and wasn't responding to conversation.  She then fell 

forward as her purse caught in the door handle, landing on the ground and 

striking the left side of her head.  No seizure activity noted.  Unconscious 

episode was very brief. Pt denies any occurrence of chest pain, shortness of 

breath, palpitations, nausea, vomiting, diaphoresis. Since the fall she has had 

a left sided rib pain that is worse with movement, palpation, and deep 

inspiration.  Patient reports he had a stress test several weeks ago performed 

by her cardiologist's office. She states this test was normal to her knowledge. 

Medical records from her cardiologist have been requested. 








Past History


Past Medical History: GERD, hypertension, migraines, other (multiple sclerosis; 

reported MVP)


Past Surgical History: No surgical history


Social history: no significant social history


Family history: no significant family history





Medications and Allergies


                                    Allergies











Allergy/AdvReac Type Severity Reaction Status Date / Time


 


No Known Allergies Allergy   Unverified 05/30/19 21:21











                                Home Medications











 Medication  Instructions  Recorded  Confirmed  Last Taken  Type


 


Avonex (Nf) 30 mg SUB-Q QWEEK 05/31/19 05/31/19 2 Weeks Ago History





    ~05/17/19 


 


Esomeprazole Magnesium [Nexium 40 mg PO DAILY 05/31/19 05/31/19 1 Day Ago 

History





24Hr]    ~05/30/19 


 


Lisinopril [Zestril TAB] 40 1000units PO DAILY 05/31/19 05/31/19 1 Day Ago 

History





    ~05/30/19 


 


Metoprolol [Lopressor TAB] 50 mg PO DAILY 05/31/19 05/31/19 1 Day Ago History





    ~05/30/19 


 


Montelukast [Singulair] 10 mg PO DAILY 05/31/19 05/31/19 2 Days Ago History





    ~05/29/19 


 


Verapamil HCl [Verapamil ER] 240 mg PO DAILY 05/31/19 05/31/19 2 Days Ago 

History





    ~05/29/19 


 


hydrALAZINE [Apresoline] 25 mg PO Q8HR 05/31/19 05/31/19 1 Day Ago History





    ~05/30/19 











Active Meds: 


Active Medications





Acetaminophen (Tylenol)  650 mg PO Q4H PRN


   PRN Reason: Pain MILD(1-3)/Fever >100.5/HA


Lorazepam (Ativan)  2 mg IV Q4H PRN


   PRN Reason: Agitation


   Last Admin: 05/31/19 09:30 Dose:  2 mg


   Documented by: 


Ondansetron HCl (Zofran)  4 mg IV Q8H PRN


   PRN Reason: Nausea And Vomiting


Oxycodone/Acetaminophen (Percocet 5/325)  1 tab PO Q6H PRN


   PRN Reason: Pain, Moderate (4-6)


   Last Admin: 05/31/19 09:29 Dose:  1 tab


   Documented by: 


Sodium Chloride (Sodium Chloride Flush Syringe 10 Ml)  10 ml IV BID LV


   Last Admin: 05/31/19 09:29 Dose:  10 ml


   Documented by: 


Sodium Chloride (Sodium Chloride Flush Syringe 10 Ml)  10 ml IV PRN PRN


   PRN Reason: LINE FLUSH











Review of Systems


Constitutional: no weight loss, no weight gain, no fever, no chills, no sweats


Ears, nose, mouth and throat: no ear pain, no nose pain, no sinus pressure, no 

sinus pain


Cardiovascular: syncope, lightheadedness, high blood pressure, no chest pain, no

 orthopnea, no palpitations, no rapid/irregular heart beat, no edema, no 

shortness of breath, no dyspnea on exertion


Respiratory: no cough, no shortness of breath, no dyspnea on exertion, no 

congestion, no wheezing, no pain on inspiration


Gastrointestinal: no abdominal pain, no nausea, no vomiting, no diarrhea, no 

constipation, no change in bowel habits


Genitourinary Female: no pelvic pain, no flank pain, no dysuria, no urinary 

frequency, no urgency


Musculoskeletal: no neck stiffness, no neck pain, no shooting arm pain, no arm 

numbness/tingling, no low back pain, no shooting leg pain, no leg 

numbness/tingling, no redness of joints


Integumentary: no rash, no pruritis, no redness, no sores, no wounds


Neurological: syncope, no head injury, no weakness, no parathesias, no numbness,

 no tingling, no seizures


Psychiatric: no anxiety


Endocrine: no cold intolerance, no heat intolerance


Hematologic/Lymphatic: no easy bruising, no easy bleeding


Allergic/Immunologic: no urticaria, no wheezing





Physical Examination


                                   Vital Signs











Temp Pulse Resp BP Pulse Ox


 


 98.0 F   87   18   105/56   98 


 


 05/30/19 21:17  05/30/19 21:17  05/30/19 21:17  05/30/19 21:17  05/30/19 21:17











General appearance: no acute distress


HEENT: Positive: PERRL, Normocephaly, Mucus Membranes Moist


Neck: Positive: neck supple, trachea midline


Cardiac: Positive: Reg Rate and Rhythm, S1/S2


Lungs: Positive: Decreased Breath Sounds


Neuro: Positive: Grossly Intact


Abdomen: Positive: Soft.  Negative: Tender


Skin: Negative: Rash, Wound


Musculoskeletal: No Pain


Extremities: Absent: edema





Results





                                 05/30/19 22:50





                                 05/30/19 22:50


                                 Cardiac Enzymes











  05/30/19 Range/Units





  22:50 


 


AST  16  (5-40)  units/L


 


CK-MB (CK-2)  2.8  (0.0-4.0)  ng/mL








                                   Coagulation











  05/30/19 Range/Units





  22:50 


 


PT  12.6  (12.2-14.9)  Sec.


 


INR  0.89  (0.87-1.13)  


 


APTT  23.8 L  (24.2-36.6)  Sec.








                                       CBC











  05/30/19 Range/Units





  22:50 


 


WBC  13.6 H  (4.5-11.0)  K/mm3


 


RBC  4.36  (3.65-5.03)  M/mm3


 


Hgb  13.9  (10.1-14.3)  gm/dl


 


Hct  41.0  (30.3-42.9)  %


 


Plt Count  189  (140-440)  K/mm3


 


Lymph #  1.7  (1.2-5.4)  K/mm3


 


Mono #  0.9 H  (0.0-0.8)  K/mm3


 


Eos #  0.2  (0.0-0.4)  K/mm3


 


Baso #  0.1  (0.0-0.1)  K/mm3








                          Comprehensive Metabolic Panel











  05/30/19 Range/Units





  22:50 


 


Sodium  141  (137-145)  mmol/L


 


Potassium  3.9  (3.6-5.0)  mmol/L


 


Chloride  108.5 H  ()  mmol/L


 


Carbon Dioxide  20 L  (22-30)  mmol/L


 


BUN  19 H  (7-17)  mg/dL


 


Creatinine  1.2  (0.7-1.2)  mg/dL


 


Glucose  110 H  ()  mg/dL


 


Calcium  9.4  (8.4-10.2)  mg/dL


 


AST  16  (5-40)  units/L


 


ALT  13  (7-56)  units/L


 


Alkaline Phosphatase  49  ()  units/L


 


Total Protein  6.8  (6.3-8.2)  g/dL


 


Albumin  3.6 L  (3.9-5)  g/dL














- Imaging and Cardiology


Echo: pending


EKG: report reviewed, image reviewed





EKG interpretations





- Telemetry


EKG Rhythm: Sinus Rhythm





- EKG


Sinus rhythms and dysrhythmias: sinus rhythm





Assessment and Plan


Currently stable cardiac status. consider resuming home anti-hypertensive 

regimen if necessary for BP management. 


Await echo.


Await medical records from 's cardiologist in [place], [place]. 





The patient has been seen in conjunction with Dr. Martinez who agrees with the 

assessment and plan of care.  








- Patient Problems


(1) Syncope


Current Visit: Yes   Status: Acute   





(2) HTN (hypertension)


Current Visit: Yes   Status: Chronic   





(3) History of mitral valve prolapse


Current Visit: Yes   Status: Chronic   





(4) Hx of multiple sclerosis


Current Visit: Yes   Status: Chronic

## 2019-05-31 NOTE — CAT SCAN REPORT
PROCEDURE:  CT HEAD/BRAIN WO CON 

 

TECHNIQUE:  Computerized tomography of the head was performed without contrast material.  

 

 

 

HISTORY: Syncope 

 

COMPARISONS:  None . 

 

FINDINGS: 

 

Brain: There is no evidence of intracranial hemorrhage.  No parenchymal hemorrhage is seen.  No mass 
lesions or mass effect is identified.  No abnormal extra-axial fluid collections or masses are seen. 
There is nonspecific mineralization of the right basal ganglia. 

 

There is some decreased density seen in the periventricular white matter without mass effect.  This i
s fairly symmetric and does not exhibit any mass effect consistent with gliosis probably on the basis
 of microvascular disease or white matter changes of aging. 

 

Ventricles:  The ventricles are normal size and are midline.. 

 

Bone Windows: No evidence of fracture. 

 

Paranasal sinuses: Visualized portions appear clear.. 

 

Mastoid air cells:  Clear. 

 

IMPRESSION: 

 

Mild decreased density periventricular white matter without mass effect suggesting gliosis related to
 microvascular disease or white matter changes of aging. 

 

No other abnormalities are identified. 

 

This document is electronically signed by Veto Bentley MD., May 31 2019 12:24:21 AM ET

## 2019-05-31 NOTE — XRAY REPORT
PROCEDURE: XR RIBS UNI W PA CHEST 3+V LT 

 

TECHNIQUE:  Unilateral rib radiographs, 2 views of the left ribs. 

 

HISTORY: fall, left rib pain 

 

COMPARISONS:  None . 

 

FINDINGS:  

 

Lung:  Normal . 

Pleural space: Normal . 

Pneumothorax:  None . 

Bony thorax/ribs:  No acute or displaced rib fractures. . 

 

IMPRESSION:  No acute or displaced rib fractures. . 

 

This document is electronically signed by Zheng Hammonds MD., May 31 2019 02:00:30 AM ET

## 2019-06-01 LAB
BUN SERPL-MCNC: 21 MG/DL (ref 7–17)
BUN/CREAT SERPL: 23 %
CALCIUM SERPL-MCNC: 9.1 MG/DL (ref 8.4–10.2)
HEMOLYSIS INDEX: 14

## 2019-06-01 RX ADMIN — VERAPAMIL HYDROCHLORIDE SCH: 240 TABLET, FILM COATED, EXTENDED RELEASE ORAL at 21:38

## 2019-06-01 RX ADMIN — OXYCODONE AND ACETAMINOPHEN PRN TAB: 5; 325 TABLET ORAL at 21:37

## 2019-06-01 RX ADMIN — Medication SCH ML: at 21:38

## 2019-06-01 RX ADMIN — Medication SCH ML: at 09:35

## 2019-06-01 RX ADMIN — HYDRALAZINE HYDROCHLORIDE SCH MG: 25 TABLET, FILM COATED ORAL at 21:38

## 2019-06-01 RX ADMIN — HYDRALAZINE HYDROCHLORIDE SCH MG: 25 TABLET, FILM COATED ORAL at 14:02

## 2019-06-01 RX ADMIN — OXYCODONE AND ACETAMINOPHEN PRN TAB: 5; 325 TABLET ORAL at 14:03

## 2019-06-01 RX ADMIN — SODIUM CHLORIDE SCH MLS/HR: 0.9 INJECTION, SOLUTION INTRAVENOUS at 11:55

## 2019-06-01 RX ADMIN — HYDRALAZINE HYDROCHLORIDE SCH MG: 25 TABLET, FILM COATED ORAL at 06:09

## 2019-06-01 RX ADMIN — MONTELUKAST SCH MG: 10 TABLET, FILM COATED ORAL at 17:38

## 2019-06-01 RX ADMIN — VERAPAMIL HYDROCHLORIDE SCH MG: 240 TABLET, FILM COATED, EXTENDED RELEASE ORAL at 09:35

## 2019-06-01 RX ADMIN — LEVOFLOXACIN SCH MLS/HR: 500 INJECTION, SOLUTION INTRAVENOUS at 11:56

## 2019-06-01 RX ADMIN — PANTOPRAZOLE SODIUM SCH MG: 40 TABLET, DELAYED RELEASE ORAL at 09:34

## 2019-06-01 RX ADMIN — METOPROLOL TARTRATE SCH MG: 50 TABLET, FILM COATED ORAL at 09:34

## 2019-06-01 NOTE — PROGRESS NOTE
Assessment and Plan





clinically stable


No evidence thus far of a cardiac etiology of syncope


tele and tte unremarkable


Per pt (good historian), recent stress mpi in [place], [place] was wnl (records 

requested)


Carotid u/s pending


IV abx started today for uti


Liberalize fluid intake


check orthostatics again


d/w pt and family at length





- Patient Problems


(1) Syncope


Current Visit: Yes   Status: Acute   





(2) HTN (hypertension)


Current Visit: Yes   Status: Chronic   





(3) Hx of multiple sclerosis


Current Visit: Yes   Status: Chronic   





Subjective


Date of service: 06/01/19


Principal diagnosis: syncope, leukocytosis, UTI. htn 


Interval history: 





no complaints


family at bedside





Objective


                                   Vital Signs











  Temp Pulse Resp BP BP Pulse Ox


 


 06/01/19 09:35   69   119/61  


 


 06/01/19 09:34   69   119/61  


 


 06/01/19 09:33   68   119/61   97


 


 06/01/19 08:41  98.5 F  65  18  114/61   95


 


 06/01/19 05:35  98.5 F  70  20  122/70   98


 


 06/01/19 00:08  98.3 F  71  20  132/77   94


 


 05/31/19 20:54  98.3 F  71  20   121/68  96


 


 05/31/19 19:00   77    


 


 05/31/19 17:41  98.3 F  70  18  153/79   97


 


 05/31/19 12:32  98.4 F  81  18  152/85   100














- Physical Examination


HEENT: Positive: PERRL, Normocephaly, Mucus Membranes Moist


Neck: Positive: neck supple, trachea midline


Neuro: Positive: Grossly Intact


Abdomen: Positive: Soft.  Negative: Tender


Skin: Negative: Rash, Wound


Musculoskeletal: No Pain


Extremities: Absent: edema





- Labs and Meds


                          Comprehensive Metabolic Panel











  06/01/19 Range/Units





  10:42 


 


Sodium  141  (137-145)  mmol/L


 


Potassium  4.4  (3.6-5.0)  mmol/L


 


Chloride  106.3  ()  mmol/L


 


Carbon Dioxide  25  (22-30)  mmol/L


 


BUN  21 H  (7-17)  mg/dL


 


Creatinine  0.9  (0.7-1.2)  mg/dL


 


Glucose  115 H  ()  mg/dL


 


Calcium  9.1  (8.4-10.2)  mg/dL














- Imaging and Cardiology


EKG: report reviewed, image reviewed


Echo: pending





- EKG


Sinus rhythms and dysrhythmias: sinus rhythm

## 2019-06-02 VITALS — DIASTOLIC BLOOD PRESSURE: 84 MMHG | SYSTOLIC BLOOD PRESSURE: 161 MMHG

## 2019-06-02 LAB
BASOPHILS # (AUTO): 0 K/MM3 (ref 0–0.1)
BASOPHILS NFR BLD AUTO: 0.7 % (ref 0–1.8)
EOSINOPHIL # BLD AUTO: 0.2 K/MM3 (ref 0–0.4)
EOSINOPHIL NFR BLD AUTO: 3.4 % (ref 0–4.3)
HCT VFR BLD CALC: 36.8 % (ref 30.3–42.9)
HGB BLD-MCNC: 12.3 GM/DL (ref 10.1–14.3)
LYMPHOCYTES # BLD AUTO: 1.6 K/MM3 (ref 1.2–5.4)
LYMPHOCYTES NFR BLD AUTO: 24.2 % (ref 13.4–35)
MCHC RBC AUTO-ENTMCNC: 34 % (ref 30–34)
MCV RBC AUTO: 94 FL (ref 79–97)
MONOCYTES # (AUTO): 0.8 K/MM3 (ref 0–0.8)
MONOCYTES % (AUTO): 11.2 % (ref 0–7.3)
PLATELET # BLD: 179 K/MM3 (ref 140–440)
RBC # BLD AUTO: 3.94 M/MM3 (ref 3.65–5.03)

## 2019-06-02 RX ADMIN — HYDRALAZINE HYDROCHLORIDE SCH MG: 25 TABLET, FILM COATED ORAL at 13:24

## 2019-06-02 RX ADMIN — BUTALBITAL, ACETAMINOPHEN, AND CAFFEINE PRN TAB: 50; 325; 40 TABLET ORAL at 13:23

## 2019-06-02 RX ADMIN — PANTOPRAZOLE SODIUM SCH MG: 40 TABLET, DELAYED RELEASE ORAL at 10:47

## 2019-06-02 RX ADMIN — OXYCODONE AND ACETAMINOPHEN PRN TAB: 5; 325 TABLET ORAL at 13:28

## 2019-06-02 RX ADMIN — LEVOFLOXACIN SCH: 500 INJECTION, SOLUTION INTRAVENOUS at 12:38

## 2019-06-02 RX ADMIN — BUTALBITAL, ACETAMINOPHEN, AND CAFFEINE PRN TAB: 50; 325; 40 TABLET ORAL at 03:12

## 2019-06-02 RX ADMIN — HYDRALAZINE HYDROCHLORIDE SCH MG: 25 TABLET, FILM COATED ORAL at 05:31

## 2019-06-02 RX ADMIN — METOPROLOL TARTRATE SCH MG: 50 TABLET, FILM COATED ORAL at 10:45

## 2019-06-02 RX ADMIN — SODIUM CHLORIDE SCH MLS/HR: 0.9 INJECTION, SOLUTION INTRAVENOUS at 00:46

## 2019-06-02 RX ADMIN — Medication SCH ML: at 10:47

## 2019-06-02 RX ADMIN — VERAPAMIL HYDROCHLORIDE SCH MG: 240 TABLET, FILM COATED, EXTENDED RELEASE ORAL at 10:45

## 2019-06-02 NOTE — PROGRESS NOTE
Assessment and Plan





clinically stable


No evidence thus far of a cardiac etiology of syncope


tele and tte unremarkable


Per pt (good historian), recent stress mpi in [place], [place] was wnl (records 

requested)


IV abx started today for uti


Liberalize fluid intake


d/w pt and family at length


Once discharged may f/u w/ primary cardiologist.





- Patient Problems


(1) Syncope


Current Visit: Yes   Status: Acute   





(2) HTN (hypertension)


Current Visit: Yes   Status: Chronic   





(3) Hx of multiple sclerosis


Current Visit: Yes   Status: Chronic   





Subjective


Principal diagnosis: syncope, leukocytosis, UTI. htn 


Interval history: 





no complaints


family at bedside





Objective


                                   Vital Signs











  Temp Pulse Resp BP BP Pulse Ox


 


 06/02/19 10:45   80   146/75  


 


 06/02/19 07:30  98.2 F  70  16   156/83  100


 


 06/02/19 07:29  98.2 F   16  156/83  


 


 06/02/19 04:54  98.2 F  69  20   145/81  99


 


 06/02/19 02:18  98.6 F  78    164/68 


 


 06/01/19 23:14   72     98


 


 06/01/19 23:12  98.0 F  64  18  125/71   98


 


 06/01/19 19:26  98.7 F  70  18  120/67   98


 


 06/01/19 19:00   71    


 


 06/01/19 15:50   69   131/73   100


 


 06/01/19 15:47   61   113/53   93


 


 06/01/19 14:02   66   138/67  


 


 06/01/19 12:32  98.4 F  66  18  138/67   99














- Physical Examination


HEENT: Positive: PERRL, Normocephaly, Mucus Membranes Moist


Neck: Positive: neck supple, trachea midline


Neuro: Positive: Grossly Intact


Abdomen: Positive: Soft.  Negative: Tender


Skin: Negative: Rash, Wound


Musculoskeletal: No Pain


Extremities: Absent: edema





- Labs and Meds


                                       CBC











  06/02/19 Range/Units





  06:07 


 


WBC  6.8  (4.5-11.0)  K/mm3


 


RBC  3.94  (3.65-5.03)  M/mm3


 


Hgb  12.3  (10.1-14.3)  gm/dl


 


Hct  36.8  (30.3-42.9)  %


 


Plt Count  179  (140-440)  K/mm3


 


Lymph #  1.6  (1.2-5.4)  K/mm3


 


Mono #  0.8  (0.0-0.8)  K/mm3


 


Eos #  0.2  (0.0-0.4)  K/mm3


 


Baso #  0.0  (0.0-0.1)  K/mm3














- Imaging and Cardiology


EKG: report reviewed, image reviewed


Echo: pending





- EKG


Sinus rhythms and dysrhythmias: sinus rhythm